# Patient Record
Sex: FEMALE | Race: OTHER
[De-identification: names, ages, dates, MRNs, and addresses within clinical notes are randomized per-mention and may not be internally consistent; named-entity substitution may affect disease eponyms.]

---

## 2017-03-09 NOTE — GI INITIAL CONSULT NOTE
History of Present Illness


General


Date patient seen:  Mar 9, 2017


Time patient seen:  13:00


Referring physician:  TATE PURCELL


Reason for Consultation:  GERD EVALUATION





Present Illness


HPI


41 year old female patient referred to [facility] by Dr. Purcell for evaluation of 

atypical CP, PVD vs gastritis.  Pt presents today with epigastric/substernal 

pain.  No GI complaints at this time.


Home Meds


Active Scripts


Clotrimazole* (LOTRIMIN*) 15 Gm Cream..g., 1 APPLIC TOPIC BID, #1 GM


   Prov:ISABEL CARVALHO         3/14/15


Reported Medications


Aripiprazole* (ABILIFY*) 10 Mg Tablet, 10 MG ORAL DAILY, TAB


   3/9/17


Escitalopram Oxalate (LEXAPRO) 5 Mg Tablet, 5 MG ORAL DAILY, TAB


   3/9/17


No Known Medications* (NKM - No Known Medications*)  ., 0 ., 0 Refills


   3/14/15


Med list reviewed/reconciled:  Yes


Allergies:  


Coded Allergies:  


     No Known Allergies (Unverified , 3/14/15)





Patient History


History Provided By:  Patient


PMH Narrative


Depression


Iron def anemia


Past Surgical History:  other - denies


Family History Narrative


denies


Social History:  Denies: alcohol use, drug use, other, smoking


Social History Narrative


coffee BID





Review of Systems


All Other Systems:  negative except mentioned in HPI





Physical Exam


T 98.8


/73


P 73


100 RA





Ht 5'4


 lbs


Sp02 EP Interpretation:  reviewed


General Appearance:  well appearing, no apparent distress, alert


Head:  normocephalic


EENT:  normal ENT inspection


Neck:  full range of motion, supple


Respiratory:  chest non-tender, lungs clear, normal breath sounds, no 

respiratory distress


Cardiovascular:  normal rate


Gastrointestinal:  non tender, normal bowel sounds


Rectal:  deferred


Neurologic:  normal inspection, alert, oriented x3, responsive


Psychiatric:  normal inspection, judgement/insight normal, memory normal


Skin:  normal inspection, normal color, no rash, warm/dry


Lymphatic:  normal inspection, no adenopathy





GI: Plan


Problems:  


(1) GERD (gastroesophageal reflux disease)


(2) Encounter for diagnostic endoscopy


(3) Iron deficiency anemia


Plan


EGD scheduled for March 17,2017.


- NPO @ [facility] prior day procedure instructed to patient.


- pt refuses colonoscopy at this time


labs to be drawn day of procedure; Iron panel, CEA, B12, Folate





Seen with Dr. Haskins.


Thank you for referring this kind patient.











Evangelina Montano N.P. Mar 9, 2017 13:51

## 2017-03-24 NOTE — IMMEDIATE POST-OP EVALUATION
Immediate Post-Op Evalulation


Immediate Post-Op Evalulation


Procedure:  EGD


Date of Evaluation:  Mar 24, 2017


Time of Evaluation:  11:49


IV Fluids:  300


Blood Pressure Systolic:  107


Blood Pressure Diastolic:  68


Pulse Rate:  66


O2 Sat by Pulse Oximetry:  98


Temperature (Fahrenheit):  97.1


Nausea:  No


Vomiting:  No


Patient Status:  awake, reacts, patent


Hydration Status:  adequate


Drug:  none











MARGE JENKINS CRNA Mar 24, 2017 12:01

## 2017-03-24 NOTE — PROCEDURE NOTE
DATE OF PROCEDURE:  03/24/2017



SURGEON:  Jonathan Flood M.D.



ANESTHESIA:  Per CRNA, Monique Montoya.



PROCEDURE:  Upper endoscopy with biopsy.



INSTRUMENT:  Olympus adult flexible upper endoscope.



INDICATION:  GERD.



REASON FOR PROCEDURE:  The procedure, risks, benefits, and possible

consequences, including hemorrhage, aspiration, perforation and infection,

and alternative treatments, were explained to the patient/legal guardian

by Dr. Jonathan Haskins and the patient/legal guardian understood and

accepted these risks.



PROCEDURE:  After informed consent was obtained and the patient was

adequately sedated, Olympus upper endoscope was advanced from the mouth

into the second portion of the duodenum and retroflexion was performed in

the stomach.  The patient has some diffuse severe atrophic gastritis.

Biopsy from body and antrum was obtained to rule out H. pylori infection.

Otherwise, the rest of the examination was within normal limits.  The

patient tolerated the procedure without any complication.



IMPRESSION:  Diffuse atrophy gastritis status post biopsy of the body

and antrum.



RECOMMENDATIONS:  Follow up biopsies and treat accordingly.  We will

also finally send serology for H. pylori.









  ______________________________________________

  Jonathan Haskins M.D. DR:  Karl

D:  03/24/2017 11:53

T:  03/24/2017 21:32

JOB#:  [id number]

CC:

## 2017-03-24 NOTE — 48 HOUR POST ANESTHESIA EVAL
Post Anesthesia Evaluation


Procedure:  EGD


Date of Evaluation:  Mar 24, 2017


Time of Evaluation:  12:31


Blood Pressure Systolic:  113


0:  68


Pulse Rate:  70


O2 Sat by Pulse Oximetry:  99


Airway:  patent


Nausea:  No


Vomiting:  No


Hydration Status:  adequate


Cardiopulmonary Status:


normal


Mental Status/LOC:  patient returned to baseline


Follow-up care needed:  N/A











MARGE JENKINS CRNA Mar 24, 2017 12:31

## 2017-03-24 NOTE — ENDOSCOPY PROCEDURE NOTE
Endoscopy Procedure Note


Indication for Procedure:  gerd


Procedures Performed:  EGD


Operative Findings/Diagnosis:  gastritis


Specimen:  yes


Pt Tolerated Procedure Well:  Yes


Estimated Blood Loss:  none


Anesthesiologist:  quinton


Anesthesia:  MAC


Implant(s) used?:  No


50 yrs or older w/o bx or poly:  Not Applicable


10yrs. F/U not recommended:  Not Applicable











ROSE MARIE CASANOVA Mar 24, 2017 11:49

## 2017-03-24 NOTE — ANETHESIA PREOPERATIVE EVAL
Anesthesia Pre-op PMH/ROS


General


Date of Evaluation:  Mar 24, 2017


Time of Evaluation:  11:30


Anesthesiologist:  krystle


ASA Score:  ASA 2


Mallampati Score


Class I : Soft palate, uvula, fauces, pillars visible


Class II: Soft palate, uvula, fauces visible


Class III: Soft palate, base of uvula visible


Class IV: Only hard plate visible


Mallampati Classification:  Class II


Anesthesia History:  none


Family History:  no anesthesia problems


Allergies:  


Coded Allergies:  


     No Known Allergies (Unverified , 3/14/15)


Medications:  see eMAR





Past Medical History


Cardiovascular:  Denies: CAD, HTN, MI, arrhythmia, other, valve dz


Pulmonary:  Denies: COPD, ARYAN, asthma, other


Neurologic/Psychiatric:  Denies: CVA, TIA, dementia, depression/anxiety, other


Endocrine:  Denies: DM, hypothyroidism, other, steroids


HEENT:  Denies: [place] (L), [place] (R), cataract (L), cataract (R), glaucoma, other


Hematology/Immune:  Reports: anemia





Anesthesia Pre-op Phys. Exam


Physician Exam





Last Vital Signs








  Date Time  Temp Pulse Resp B/P Pulse Ox O2 Delivery O2 Flow Rate FiO2


 


3/24/17 12:01  66   98   


 


3/24/17 12:00   16 109/77  Room Air  


 


3/24/17 11:46 98.5       








Neurologic:  CN 2-12 intact


Cardiovascular:  RRR


Respiratory:  CTA


Gastrointestinal:  S/NT/ND





Airway Exam


Mallampati Classification


2


Mallampati Score:  Class II


MO:  full


ROM:  full


Dentures:  no lower, no upper





Anesthesia Pre-op A/P


Labs





Hematology








Test


  3/24/17


11:20


 


White Blood Count


  4.8 K/UL


(4.8-10.8)


 


Red Blood Count


  3.67 M/UL


(4.20-5.40)  L


 


Hemoglobin


  11.8 G/DL


(12.0-16.0)  L


 


Hematocrit


  34.9 %


(37.0-47.0)  L


 


Mean Corpuscular Volume 95 FL (80-99)  


 


Mean Corpuscular Hemoglobin


  32.3 PG


(27.0-31.0)  H


 


Mean Corpuscular Hemoglobin


Concent 33.9 G/DL


(32.0-36.0)


 


Red Cell Distribution Width


  11.2 %


(11.6-14.8)  L


 


Platelet Count


  294 K/UL


(150-450)


 


Mean Platelet Volume


  7.4 FL


(6.5-10.1)


 


Neutrophils (%) (Auto)


  61.6 %


(45.0-75.0)


 


Lymphocytes (%) (Auto)


  23.8 %


(20.0-45.0)


 


Monocytes (%) (Auto)


  10.5 %


(1.0-10.0)  H


 


Eosinophils (%) (Auto)


  2.5 %


(0.0-3.0)


 


Basophils (%) (Auto)


  1.6 %


(0.0-2.0)








Chemistry








Test


  3/24/17


11:20


 


Iron Level


  42 ug/dL


()


 


Total Iron Binding Capacity


  310 ug/dL


(250-400)


 


Percent Iron Saturation 14 % (15-50)  L


 


Unsaturated Iron Binding


  268 ug/dL


(112-346)


 


Carcinoembryonic Antigen 1.8 ng/mL  


 


Vitamin B1 Level Pending  


 


Folate Pending  








Urine Pregnancy Test











Test


  3/24/17


09:50


 


Urine HCG, Qualitative Negative  











Studies


Pre-op Studies:  EKG - SR





Risk Assessment & Plan


Plan:


mac


Status Change Before Surgery:  No





Pre-Antibiotics


Drug:  none











MARGE JENKINS CRNA Mar 24, 2017 12:32

## 2017-03-24 NOTE — SHORT STAY SURGERY H&P
History of Present Illness


History of Present Illness


Chief Complaint


see recent consult note


HPI


Anne-Marie Tavarez is a 41 year old female who was admitted on  for 

Abdominal Pain





Patient History


Allergies:  


Coded Allergies:  


     No Known Allergies (Unverified , 3/14/15)


PAST MEDICAL HISTORY:  


Past Surgeries:  


Social History:  





Medication History


Scheduled


Aripiprazole* (Abilify*), 2 MG ORAL DAILY, (Reported)


Escitalopram Oxalate* (Lexapro*), 10 MG ORAL DAILY, (Reported)


Ferrous Sulfate (Iron), 325 MG PO DAILY, (Reported)





Physical Exam


Vital Signs





Last Vital Signs








  Date Time  Temp Pulse Resp B/P Pulse Ox O2 Delivery O2 Flow Rate FiO2


 


3/24/17 10:59 98.5 54 18 107/57 100 Room Air  








Labs





Laboratory Tests








Test


  3/24/17


09:50 3/24/17


11:20


 


Urine HCG, Qualitative Negative   


 


White Blood Count  Pending  


 


Red Blood Count  Pending  


 


Hemoglobin  Pending  


 


Hematocrit  Pending  


 


Mean Corpuscular Volume  Pending  


 


Mean Corpuscular Hemoglobin  Pending  


 


Mean Corpuscular Hemoglobin


Concent 


  Pending  


 


 


Red Cell Distribution Width  Pending  


 


Platelet Count  Pending  


 


Mean Platelet Volume  Pending  


 


Neutrophils (%) (Auto)  Pending  


 


Lymphocytes (%) (Auto)  Pending  


 


Monocytes (%) (Auto)  Pending  


 


Eosinophils (%) (Auto)  Pending  


 


Basophils (%) (Auto)  Pending  


 


Iron Level  Pending  


 


Unsaturated Iron Binding  Pending  


 


Carcinoembryonic Antigen  Pending  


 


Vitamin B1 Level  Pending  


 


Folate  Pending  











Plan


Attestation


Are the patient's medical conditions optimized for surgery?











ROSE MARIE CASANOVA Mar 24, 2017 11:33

## 2017-04-06 NOTE — GI PROGRESS NOTE
Assessment/Plan


Problems:  


(1) Iron deficiency anemia


ICD Codes:  D50.9 - Iron deficiency anemia, unspecified


SNOMED:  41475433


(2) Depression


ICD Codes:  F32.9 - Major depressive disorder, single episode, unspecified


SNOMED:  82870848


(3) GERD (gastroesophageal reflux disease)


ICD Codes:  K21.9 - Gastro-esophageal reflux disease without esophagitis


SNOMED:  701961035


(4) Encounter for diagnostic endoscopy


ICD Codes:  Z01.818 - Encounter for other preprocedural examination


SNOMED:  818740196, 321383382


Status:  stable


Status Narrative


Seen with Dr. Haskins.


Assessment/Plan


EGD reviewed with patient.


fu biopsy result >> HP negative


RTC prn





Subjective


Gastrointestinal/Abdominal:  Reports: no symptoms





Objective





Last 24 Hour Vital Signs








  Date Time  Temp Pulse Resp B/P Pulse Ox O2 Delivery O2 Flow Rate FiO2


 


4/6/17 13:37 99.0 82 16 148/74    








General Appearance:  no apparent distress, alert


Cardiovascular:  normal rate, regular rhythm


Respiratory/Chest:  normal breath sounds, no respiratory distress, no accessory 

muscle use


Abdominal Exam:  normal bowel sounds, non tender, soft


Extremities:  normal range of motion


Objective


DATE OF PROCEDURE:  03/24/2017


SURGEON:  Jonathan Flood M.D.


INDICATION:  GERD.





REASON FOR PROCEDURE:  The procedure, risks, benefits, and possible


consequences, including hemorrhage, aspiration, perforation and infection,


and alternative treatments, were explained to the patient/legal guardian


by Dr. Jonathan Haskins and the patient/legal guardian understood and


accepted these risks.





PROCEDURE:  After informed consent was obtained and the patient was


adequately sedated, Olympus upper endoscope was advanced from the mouth


into the second portion of the duodenum and retroflexion was performed in


the stomach.  The patient has some diffuse severe atrophic gastritis.


Biopsy from body and antrum was obtained to rule out H. pylori infection.


Otherwise, the rest of the examination was within normal limits.  The


patient tolerated the procedure without any complication.





IMPRESSION:  Diffuse atrophy gastritis status post biopsy of the body


and antrum.





RECOMMENDATIONS:  Follow up biopsies and treat accordingly.  We will


also finally send serology for H. pylori.











Evangelina Montano N.P. Apr 6, 2017 14:07

## 2019-06-05 ENCOUNTER — HOSPITAL ENCOUNTER (EMERGENCY)
Dept: HOSPITAL 72 - EMR | Age: 44
Discharge: HOME | End: 2019-06-05
Payer: MEDICARE

## 2019-06-05 VITALS — DIASTOLIC BLOOD PRESSURE: 68 MMHG | SYSTOLIC BLOOD PRESSURE: 108 MMHG

## 2019-06-05 VITALS — DIASTOLIC BLOOD PRESSURE: 72 MMHG | SYSTOLIC BLOOD PRESSURE: 110 MMHG

## 2019-06-05 VITALS — HEIGHT: 66 IN | WEIGHT: 150 LBS | BODY MASS INDEX: 24.11 KG/M2

## 2019-06-05 DIAGNOSIS — F32.9: ICD-10-CM

## 2019-06-05 DIAGNOSIS — N63.20: Primary | ICD-10-CM

## 2019-06-05 PROCEDURE — 99282 EMERGENCY DEPT VISIT SF MDM: CPT

## 2019-06-05 NOTE — NUR
ED Nurse Note:



PT WALKED IN TO ER TODAY FROM HOME. AOX4. PT C/O LEFT BREAST BUMP X 1 WEEK AGO. 
PT STATES SHE WAS DIAGNOSED WITH MASTITIS AND COMPLETED HER KEFLEX REGIMEN. PT 
DENIES ANY PAIN AND ALSO DENIES DISCHARGE OR BLEEDING FROM NIPPLES.

## 2019-06-05 NOTE — NUR
ED Nurse Note:



PT SITTING PEACEFULLY IN BED IN NAD. AOX4. PRESCRIPTION AND DISCHARGE PAPERWORK 
EXPLAINED TO PT. PT VERBALIZES UNDERSTANDING AND ALL QUESTIONS ANSWERED. 
PRESCRIPTIONS AND DISCHARGE PAPERWORK GIVEN TO PT AND ID WRISTBAND REMOVED. PT 
WALKED OUT OF ER WITH STEADY GAIT AND ALL BELONGINGS.

## 2019-06-05 NOTE — EMERGENCY ROOM REPORT
History of Present Illness


General


Chief Complaint:  Skin Rash/Abscess


Source:  Patient





Present Illness


HPI


43-year-old female with no significant past medical history here complaining of 

minimal pain in her left breast.  Patient reports that pain 10 days ago she 

went to a different hospital was diagnosed with mastitis and was given Keflex.  

Patient is currently not breast-feeding.  She finished a course of the 

antibiotic treatment however complains of a small mass left at the site of the 

infection rating the pain 2 out of 10 without radiation.  Mammogram done a year 

ago and reports that it was normal.  Patient denies pain radiation, swelling of 

the left breast, chest pain, fever and chills.  Denies nipple discharge, pus 

drainage from the breast, S OB, abdominal pain nausea vomiting.  Patient has 

not taken any medication for pain nor swelling


Allergies:  


Coded Allergies:  


     No Known Allergies (Unverified , 3/14/15)





Patient History


Past Medical History:  see triage record


Past Surgical History:  unable to obtain


Pertinent Family History:  none


Last Menstrual Period:  05/11/19


Pregnant Now:  No


Immunizations:  UTD


Reviewed Nursing Documentation:  PMH: Agreed; PSxH: Agreed





Nursing Documentation-PMH


Hx Cardiac Problems:  No


Hx Cancer:  No


Hx Gastrointestinal Problems:  No


History Of Psychiatric Problem:  Yes - depression


Hx Neurological Problems:  No





Review of Systems


All Other Systems:  negative except mentioned in HPI





Physical Exam





Vital Signs








  Date Time  Temp Pulse Resp B/P (MAP) Pulse Ox O2 Delivery O2 Flow Rate FiO2


 


6/5/19 14:05 98.1 88 16 104/67 (79) 99 Room Air  








Sp02 EP Interpretation:  reviewed, normal


General Appearance:  normal inspection, well appearing, no apparent distress


Head:  normocephalic, atraumatic


Eyes:  bilateral eye normal inspection, bilateral eye PERRL


ENT:  normal ENT inspection, hearing grossly normal, normal pharynx


Neck:  normal inspection, full range of motion, supple


Respiratory:  normal inspection, chest non-tender, lungs clear, no rhonchi, no 

wheezing


Cardiovascular #1:  normal inspection, regular rate, rhythm, no edema, no murmur


Gastrointestinal:  normal inspection, non tender, soft


Rectal:  deferred


Genitourinary:  normal inspection, no CVA tenderness


Musculoskeletal:  normal inspection, back normal, digits/nails normal


Neurologic:  normal inspection, alert, oriented x3


Psychiatric:  normal inspection, judgement/insight normal


Skin:  no rash, warm/dry, other - mobile mass left breast at 12 O clock, not 

infected, no pus drainage, no nipple discharge


Lymphatic:  normal inspection, no adenopathy





Medical Decision Making


PA Attestation


All my diagnosis and treatment plans were reviewed ad discussed with my 

supervising physician Dr. Simeon


Diagnostic Impression:  


 Primary Impression:  


 Left breast mass


ER Course


43-year-old female with no significant past medical history here complaining of 

minimal pain in her left breast.  Patient reports that pain 10 days ago she 

went to a different hospital was diagnosed with mastitis and was given Keflex.  

Patient is currently not breast-feeding.  She finished a course of the 

antibiotic treatment however complains of a small mass left at the site of the 

infection rating the pain 2 out of 10 without radiation.  Mammogram done a year 

ago and reports that it was normal.  Patient denies pain radiation, swelling of 

the left breast, chest pain, fever and chills.  Denies nipple discharge, pus 

drainage from the breast, S OB, abdominal pain nausea vomiting.  Patient has 

not taken any medication for pain nor swell





Ddx considered but are not limited to: Mastitis, breast cyst, breast abscess














Vital signs: are WNL, pt. is afebrile








 H&PE are most consistent with noninfected breast mass














ORDERS: Naproxen











ED INTERVENTIONS: None required at this time.























DISCHARGE: At this time pt. is stable for d/c to home. Will provide printed 

patient care instructions, and any necessary prescriptions. Care plan and 

follow up instructions have been discussed with the patient prior to discharge.

  Follow-up with your primary care provider for breast ultrasound and also 

mammogram, no sign of further infection noted the swelling of the affected area 

can take weeks to subside take medication as directed and lifting heavy objects 

with the affected side





Last Vital Signs








  Date Time  Temp Pulse Resp B/P (MAP) Pulse Ox O2 Delivery O2 Flow Rate FiO2


 


6/5/19 14:23 98.2 82 17 108/68 100 Room Air  








Disposition:  HOME, SELF-CARE


Condition:  Stable


Scripts


Naproxen* (NAPROXEN*) 500 Mg Tablet


500 MG ORAL TWICE A DAY, #15 TAB


   Prov: Chan Greer         6/5/19


Patient Instructions:  Breast Cyst





Additional Instructions:  


See primary care provider for follow-up and also ultrasound of the breast no 

signs of infection is noted anymore however take naproxen for the pain possible 

cyst still exist inside the breast but no drainage needed as it is hurting and 

you have already finished a course of antibiotics











Chan Greer Jun 5, 2019 14:34